# Patient Record
(demographics unavailable — no encounter records)

---

## 2025-05-08 NOTE — ASSESSMENT
[Verbal] : Verbal [Written] : Written [Demo] : Demo [Patient] : Patient [Spouse] : Spouse [Good - alert, interested, motivated] : Good - alert, interested, motivated [Verbalizes knowledge/Understanding] : Verbalizes knowledge/understanding [Dressing changes] : dressing changes [Skin Care] : skin care [Signs and symptoms of infection] : sign and symptoms of infection [Nutrition] : nutrition [Arterial Disease] : arterial disease [How and When to Call] : how and when to call [Pain Management] : pain management [Patient responsibility to plan of care] : patient responsibility to plan of care [Glycemic Control] : glycemic control [] : Yes [Stable] : stable [Hospital] : Hospital [Ambulatory] : Ambulatory [Not Applicable - Long Term Care/Home Health Agency] : Long Term Care/Home Health Agency: Not Applicable [FreeTextEntry2] : Infection prevention Localized wound care  Goal remaining pain free regarding wounds Low glycemic diet   [FreeTextEntry4] : Script provided for x - ray. Having completed today.  Pt has mupirocin at home and can perform his own dressing changes.  Auth submitted for MRI.  Auth submitted for vascular studies.  Auth submitted for punch biopsy.  Nutrition consult submitted.  follow up in 1 week

## 2025-05-08 NOTE — PLAN
[FreeTextEntry1] : Patient examined and evaluated at this time. Continue local wound care and offloading. Will auth for vascular studies. Referral to vascular surgery for vascular evaluation. Ordered radiographs, and will request auth for MRI of the right lower leg. Will request auth for biopsy of the right lower leg. Spent 30 minutes for patient care and medical decision making. Patient to follow up in 1 week.

## 2025-05-08 NOTE — PHYSICAL EXAM
[2+] : left 2+ [Skin Ulcer] : ulcer [2 x 2] : 2 x 2  [Ankle Swelling (On Exam)] : not present [de-identified] : A&Ox3, NAD [de-identified] : right lower leg wound down to skin, subcutaneous tissue, fat, hypergranulated [de-identified] : light touch sensation intact bilaterally [FreeTextEntry1] : Right anterior leg  [FreeTextEntry2] : 1.3 [FreeTextEntry3] : 1.3 [FreeTextEntry4] : 0.1 with hypergranulation  [de-identified] : Serous/sanguinous [de-identified] : Mupirocin  [de-identified] : Mechanically cleansed with sterile gauze and normal saline. Cloth tape  [de-identified] : Dorsalis Pedis: 0 Posterior Tibialis: 0 Doppler pulses:  Present. All biphasic  Extremity color: Normal  Extremity temperature: Warm/normal  Capillary refill: < 3 sec FELICE: auth submitted  [TWNoteComboBox4] : Small [TWNoteComboBox6] : Other [de-identified] : Normal [de-identified] : None [de-identified] : None [de-identified] : 100% [de-identified] : No [de-identified] : Daily [de-identified] : Primary Dressing

## 2025-05-08 NOTE — HISTORY OF PRESENT ILLNESS
[FreeTextEntry1] : Pt hit his right leg on a wooden plank 2 years ago. It has been opening and closing during this time. He states he hit it again about 2 months ago opening it. He saw his PCP 3 weeks ago who prescribed topical Silvadene and Mupirocin. Patient accompanied by spouse.

## 2025-05-21 NOTE — ASSESSMENT
[FreeTextEntry1] : A: 66-year-old male with chronic recurring right pretibial wound. ABIs were not obtained however toe pressures appear adequate for wound healing. Palpable PT pulses bilaterally No significant superficial venous reflux bilaterally.  P: No intervention is warranted from vascular surgery standpoint currently. He can proceed with biopsy or surgery as needed by podiatry. Return to vascular surgery clinic PRN.

## 2025-05-21 NOTE — HISTORY OF PRESENT ILLNESS
[FreeTextEntry1] : 66-year-old male was referred to vascular surgery clinic for chronic right pretibial wound.  Patient hit his leg on a wooden plank 2 years ago.  The wound has been healing and recurring in the past.  Currently it does not appear to be healing.  Patient denies smoking.  He ambulates independently.  He has history of hyperlipidemia, hypertension and diabetes. Dr Quiroz is planning to proceed with punch biopsy.

## 2025-05-21 NOTE — PHYSICAL EXAM
[Calm] : calm [de-identified] : Alert and oriented X3, no acute distress [de-identified] : Non labored breaths [de-identified] : ALEJANDROR [FreeTextEntry1] : Palpable femoral pulse bilaterally Palpable posterior tibial pulse bilaterally Audible dorsalis pedis and peroneal signals bilaterally Presence of right pretibial wound with exophytic appearance. Some surrounding erythema [de-identified] : soft, nt, nd [de-identified] : muscle strength 5/5 all extremities [de-identified] : CN II-XII grossly intact

## 2025-05-23 NOTE — VITALS
[Pain related to present condition?] : The patient's  pain is not related to present condition. [] : No [de-identified] : Patient has no pain related to wound.

## 2025-05-23 NOTE — PROCEDURE
[FreeTextEntry9] : 11:11 [de-identified] : right lower leg wound down to skin, subcutaneous tissue, fat, hypergranulated [de-identified] : danica [de-identified] : boubacar [FreeTextEntry6] : right lower leg wound down to skin, subcutaneous tissue, fat, hypergranulated [FreeTextEntry7] : right lower leg wound down to skin, subcutaneous tissue, fat, hypergranulated [de-identified] : 2mL [de-identified] : Skin, subcutaneous tissue, fat

## 2025-05-23 NOTE — REVIEW OF SYSTEMS
[Skin Lesions] : skin lesion [Skin Wound] : skin wound [Negative] : Heme/Lymph [FreeTextEntry1] : 10:30

## 2025-05-23 NOTE — ASSESSMENT
[Verbal] : Verbal [Written] : Written [Demo] : Demo [Patient] : Patient [Spouse] : Spouse [Good - alert, interested, motivated] : Good - alert, interested, motivated [Verbalizes knowledge/Understanding] : Verbalizes knowledge/understanding [Dressing changes] : dressing changes [Skin Care] : skin care [Signs and symptoms of infection] : sign and symptoms of infection [Nutrition] : nutrition [How and When to Call] : how and when to call [Pain Management] : pain management [Patient responsibility to plan of care] : patient responsibility to plan of care [Glycemic Control] : glycemic control [] : Yes [Stable] : stable [Home] : Home [Ambulatory] : Ambulatory [Not Applicable - Long Term Care/Home Health Agency] : Long Term Care/Home Health Agency: Not Applicable [FreeTextEntry2] : Infection prevention Localized wound care  Goal remaining pain free regarding wounds Low glycemic diet  Punch biopsy/Pathology [FreeTextEntry4] : -Punch biopsy tolerated well -MRI and XRAY reviewed by [facility] -Vascular studies completed, vascular consult completed- no vascular intervention -Nutrition consult submitted on previous visit, will speak to Chelsi to schedule nutrition for next visit. -F/U 1 week

## 2025-05-23 NOTE — PHYSICAL EXAM
[2 x 2] : 2 x 2  [2+] : left 2+ [Skin Ulcer] : ulcer [Ankle Swelling (On Exam)] : not present [de-identified] : A&Ox3, NAD [de-identified] : right lower leg wound down to skin, subcutaneous tissue, fat, hypergranulated [de-identified] : light touch sensation intact bilaterally [FreeTextEntry1] : Right anterior leg  [FreeTextEntry2] : 1.3 [FreeTextEntry3] : 1.3 [FreeTextEntry4] : 0.1  [de-identified] : Serous/sanguinous [de-identified] : hypergranular tissue [FreeTextEntry5] : punch biopsy [de-identified] : @ 11:00 [de-identified] : Adaptic touch, [place] [de-identified] : Mechanically cleansed with sterile gauze and normal saline. Cloth tape   DPM advised patient to apply adaptic touch with dry dressing on Monday  Post biopsy measurement: 1.3cmx1.3cmx0.3cm (center of wound with depth from the biopsy site) [TWNoteComboBox4] : Small [TWNoteComboBox5] : No [TWNoteComboBox6] : Other [de-identified] : No [de-identified] : Normal [de-identified] : None [de-identified] : None [de-identified] : 100% [de-identified] : No [de-identified] : Lidocaine 1%, Epinephrine 1:100,000, 8.4% Na Bicarb [TWNoteComboBox7] : Other [de-identified] : Biopsy taken and sent for pathology [de-identified] : Daily [de-identified] : Primary Dressing